# Patient Record
Sex: MALE | Race: WHITE | URBAN - METROPOLITAN AREA
[De-identification: names, ages, dates, MRNs, and addresses within clinical notes are randomized per-mention and may not be internally consistent; named-entity substitution may affect disease eponyms.]

---

## 2017-12-25 ENCOUNTER — HOSPITAL ENCOUNTER (EMERGENCY)
Facility: CLINIC | Age: 62
Discharge: HOME | End: 2017-12-25
Attending: EMERGENCY MEDICINE | Admitting: EMERGENCY MEDICINE
Payer: COMMERCIAL

## 2017-12-25 VITALS
HEIGHT: 72 IN | DIASTOLIC BLOOD PRESSURE: 85 MMHG | OXYGEN SATURATION: 95 % | SYSTOLIC BLOOD PRESSURE: 143 MMHG | RESPIRATION RATE: 18 BRPM | HEART RATE: 68 BPM | TEMPERATURE: 98.1 F | WEIGHT: 210 LBS | BODY MASS INDEX: 28.44 KG/M2

## 2017-12-25 DIAGNOSIS — H11.31 SUBCONJUNCTIVAL HEMORRHAGE OF RIGHT EYE: ICD-10-CM

## 2017-12-25 PROCEDURE — 99282 EMERGENCY DEPT VISIT SF MDM: CPT

## 2017-12-25 ASSESSMENT — VISUAL ACUITY
OS: 20/30
OD: 20/25

## 2017-12-25 ASSESSMENT — ENCOUNTER SYMPTOMS
HEADACHES: 0
EYE PAIN: 1
EYE REDNESS: 1
PHOTOPHOBIA: 0

## 2017-12-25 NOTE — ED AVS SNAPSHOT
Emergency Department    [address] [place] [id number]    Phone:  [phone]    Fax:  [phone]                                       Gera Tobin   MRN: [id number]    Department:   Emergency Department   Date of Visit:  12/25/2017           After Visit Summary Signature Page     I have received my discharge instructions, and my questions have been answered. I have discussed any challenges I see with this plan with the nurse or doctor.    ..........................................................................................................................................  Patient/Patient Representative Signature      ..........................................................................................................................................  Patient Representative Print Name and Relationship to Patient    ..................................................               ................................................  Date                                            Time    ..........................................................................................................................................  Reviewed by Signature/Title    ...................................................              ..............................................  Date                                                            Time

## 2017-12-25 NOTE — DISCHARGE INSTRUCTIONS
Subconjunctival Hemorrhage    A subconjunctival hemorrhage is a result of a broken blood vessel in the white part of the eye. It is usually painless and may be caused by coughing, sneezing, or vomiting. An injury to the eye can cause this. It can also be a sign of high blood pressure (hypertension) or a bleeding disorder.  This can look frightening. But the presence of the blood is not serious. The blood will be reabsorbed without treatment within 2 to 3 weeks.  Home care  You may continue your usual activities.  Follow-up care  Follow up with your healthcare provider, or as advised.  When to seek medical advice  Contact your healthcare provider right away if any of these occur:    Pain in the eye    Change in vision    The blood does not go away within 3 weeks    Increasing redness or swelling of the eye    Severe headache or dizziness    Signs of bruising or bleeding from other parts of your body  Date Last Reviewed: 8/1/2017 2000-2017 [employer]. [address] [place] [address], [place] [address]. All rights reserved. This information is not intended as a substitute for professional medical care. Always follow your healthcare professional's instructions.

## 2017-12-25 NOTE — ED PROVIDER NOTES
History     Chief Complaint:  Right eye pain and redness    HPI   Gera Tobin is a 62 year old male with a history of asymptomatic rheumatoid arthritis on Methotrexate and Imbrel who presents with right eye redness and pain. The patient states that tonight around 2200 he was walking out to his car when he had a sudden onset of right eye pain followed by noticeably redness to the lateral aspect of the eye immediately upon inspection. He denies any strain, coughing/sneezing, falls, or trauma to the eye prior to this. He has had continued pain and given the extensive redness he presents here for evaluation. He denies any headache, vision changes or vision loss. He denies any other injury or complaints involving the left eye.    Allergies:  No known drug allergies     Medications:    Methotrexate  Imbrel    Past Medical History:    Rheumatoid arthritis    Past Surgical History:    History reviewed. No pertinent surgical history.     Family History:    History reviewed. No pertinent family history.      Social History:  Smoking status: Never smoker  Alcohol use: No   Patient presents with significant other      Review of Systems   Eyes: Positive for pain and redness. Negative for photophobia and visual disturbance.   Neurological: Negative for headaches.       Physical Exam   First Vitals:  BP: 143/85  Pulse: 68  Temp: 98.1  F (36.7  C)  Resp: 18  Height: 182.9 cm (6')  Weight: 95.3 kg (210 lb)  SpO2: 95 %  Visual Acuity-Left: 20/30  Visual Acuity-Right: 20/25      Physical Exam  Constitutional: Well-developed and well-nourished. Alert and non-toxic appearing.  HENT:   Head: No evidence of craniofacial trauma.  Mouth/Throat: Oropharynx is clear and moist.  Ears: External ears normal.  Eyes:  Large subconjunctival hemorrhage in the lateral portion of the right eye. Does not involve the iris.     Fundoscopic exam shows normal fundus. No evidence of blood or vascular abnormality.    EOMI. No foreign body.  Neck: Normal  range of motion. Neck supple.   Cardiovascular: Regular rate and rhythm. Normal heart sounds. Intact distal pulses.  No gallop, friction rub or murmur.  Pulmonary/Chest: Normal respiratory effort. Breath sounds normal.  Musculoskeletal: No gross deformity or tenderness.  Neuro: Alert and oriented x3. No gross sensorimotor deficits.   Skin: Skin is warm and dry. No rash.    Emergency Department Course     Past medical records, nursing notes, and vitals reviewed.  0023: I performed an exam of the patient as documented above. Clinical findings and plan explained to the Patient. Patient discharged home with instructions regarding supportive care, medications, and reasons to return as well as the importance of close follow-up were reviewed.      Impression & Plan      Medical Decision Making:  The patient presents with sudden onset of redness in his right eye. On exam, he has a large subconjunctival hemorrhage that does not involve the iris. There are no visual changes and no signs to suggest an infectious process. Fundus appears normal. At this point I do not feel he needs further treatment and can safely be discharged to home. I recommended cool packs to the eye and artificial tears to keep the area moist. Follow up with primary doctor and return for problems.    Diagnosis:    ICD-10-CM    1. Subconjunctival hemorrhage of right eye H11.31        Christian Tobin  12/25/2017    EMERGENCY DEPARTMENT  IChristian, am serving as a scribe at 12:23 AM on 12/25/2017 to document services personally performed by Alonso Love MD based on my observations and the provider's statements to me.       Alonso Love MD  12/25/17 0333